# Patient Record
Sex: FEMALE | Race: WHITE | NOT HISPANIC OR LATINO | Employment: FULL TIME | URBAN - METROPOLITAN AREA
[De-identification: names, ages, dates, MRNs, and addresses within clinical notes are randomized per-mention and may not be internally consistent; named-entity substitution may affect disease eponyms.]

---

## 2023-07-13 LAB — HEPATITIS B VIRUS SURFACE AG PRESENCE IN SERUM: NONREACTIVE

## 2023-07-14 LAB
HEPATITIS C VIRUS AB PRESENCE IN SERUM: NONREACTIVE
HIV 1/ 2 AG/AB SCREEN: NONREACTIVE
HSV-1 WHOLE BLOOD: NORMAL
HSV-2 WHOLE BLOOD: NORMAL
SYPHILIS TOTAL AB: NONREACTIVE

## 2023-07-15 LAB
CHLAMYDIA TRACH., AMPLIFIED: NEGATIVE
N. GONORRHEA, AMPLIFIED: NEGATIVE

## 2023-09-27 LAB
ALANINE AMINOTRANSFERASE (SGPT) (U/L) IN SER/PLAS: 25 U/L (ref 7–45)
ALBUMIN (G/DL) IN SER/PLAS: 4.4 G/DL (ref 3.4–5)
ALKALINE PHOSPHATASE (U/L) IN SER/PLAS: 35 U/L (ref 33–110)
ANION GAP IN SER/PLAS: 10 MMOL/L (ref 10–20)
ASPARTATE AMINOTRANSFERASE (SGOT) (U/L) IN SER/PLAS: 36 U/L (ref 9–39)
BILIRUBIN TOTAL (MG/DL) IN SER/PLAS: 0.5 MG/DL (ref 0–1.2)
CALCIUM (MG/DL) IN SER/PLAS: 9.1 MG/DL (ref 8.6–10.3)
CARBON DIOXIDE, TOTAL (MMOL/L) IN SER/PLAS: 25 MMOL/L (ref 21–32)
CHLORIDE (MMOL/L) IN SER/PLAS: 105 MMOL/L (ref 98–107)
CREATININE (MG/DL) IN SER/PLAS: 0.78 MG/DL (ref 0.5–1.05)
ERYTHROCYTE DISTRIBUTION WIDTH (RATIO) BY AUTOMATED COUNT: 12.1 % (ref 11.5–14.5)
ERYTHROCYTE MEAN CORPUSCULAR HEMOGLOBIN CONCENTRATION (G/DL) BY AUTOMATED: 32.5 G/DL (ref 32–36)
ERYTHROCYTE MEAN CORPUSCULAR VOLUME (FL) BY AUTOMATED COUNT: 94 FL (ref 80–100)
ERYTHROCYTES (10*6/UL) IN BLOOD BY AUTOMATED COUNT: 4.16 X10E12/L (ref 4–5.2)
GFR FEMALE: >90 ML/MIN/1.73M2
GLUCOSE (MG/DL) IN SER/PLAS: 84 MG/DL (ref 74–99)
HEMATOCRIT (%) IN BLOOD BY AUTOMATED COUNT: 39.1 % (ref 36–46)
HEMOGLOBIN (G/DL) IN BLOOD: 12.7 G/DL (ref 12–16)
IGA (MG/DL) IN SER/PLAS: 153 MG/DL (ref 70–400)
LEUKOCYTES (10*3/UL) IN BLOOD BY AUTOMATED COUNT: 4.1 X10E9/L (ref 4.4–11.3)
NRBC (PER 100 WBCS) BY AUTOMATED COUNT: 0 /100 WBC (ref 0–0)
PLATELETS (10*3/UL) IN BLOOD AUTOMATED COUNT: 294 X10E9/L (ref 150–450)
POTASSIUM (MMOL/L) IN SER/PLAS: 4 MMOL/L (ref 3.5–5.3)
PROTEIN TOTAL: 6.9 G/DL (ref 6.4–8.2)
SODIUM (MMOL/L) IN SER/PLAS: 136 MMOL/L (ref 136–145)
THYROTROPIN (MIU/L) IN SER/PLAS BY DETECTION LIMIT <= 0.05 MIU/L: 0.57 MIU/L (ref 0.44–3.98)
TISSUE TRANSGLUTAMINASE, IGA: <1 U/ML (ref 0–14)
UREA NITROGEN (MG/DL) IN SER/PLAS: 11 MG/DL (ref 6–23)

## 2024-02-15 ENCOUNTER — OFFICE VISIT (OUTPATIENT)
Dept: OBSTETRICS AND GYNECOLOGY | Facility: CLINIC | Age: 29
End: 2024-02-15
Payer: COMMERCIAL

## 2024-02-15 VITALS — WEIGHT: 120 LBS | HEIGHT: 61 IN | BODY MASS INDEX: 22.66 KG/M2

## 2024-02-15 DIAGNOSIS — Z11.3 SCREEN FOR STD (SEXUALLY TRANSMITTED DISEASE): Primary | ICD-10-CM

## 2024-02-15 DIAGNOSIS — N89.8 VAGINAL IRRITATION: ICD-10-CM

## 2024-02-15 PROCEDURE — 87205 SMEAR GRAM STAIN: CPT

## 2024-02-15 PROCEDURE — 1036F TOBACCO NON-USER: CPT | Performed by: OBSTETRICS & GYNECOLOGY

## 2024-02-15 PROCEDURE — 87661 TRICHOMONAS VAGINALIS AMPLIF: CPT

## 2024-02-15 PROCEDURE — 99214 OFFICE O/P EST MOD 30 MIN: CPT | Performed by: OBSTETRICS & GYNECOLOGY

## 2024-02-15 PROCEDURE — 87800 DETECT AGNT MULT DNA DIREC: CPT

## 2024-02-15 RX ORDER — NORGESTREL AND ETHINYL ESTRADIOL 0.3-0.03MG
KIT ORAL
COMMUNITY
Start: 2019-11-23

## 2024-02-15 RX ORDER — FLUCONAZOLE 150 MG/1
150 TABLET ORAL ONCE
Qty: 1 TABLET | Refills: 0 | Status: SHIPPED | OUTPATIENT
Start: 2024-02-15 | End: 2024-02-15

## 2024-02-15 NOTE — PROGRESS NOTES
Patient complaining of vaginal irritation and itch with a small amount of discharge since last week.  She has recently been in [place].  No other new changes.  Using oral contraceptive pill.  Same partner.  Tried MetroGel but thinks her symptoms got a little worse.    PE  EGBUS erythema of bilateral labia minora and introitus.  Vagina patient declined speculum exam due to discomfort    A/P: Acute vaginitis appears to be consistent with yeast.  Patient would like STD screen  - gram stain  - gc/chlamydia  - trich  - diflucan x 1  Notify of results and follow up

## 2024-02-16 LAB
C TRACH RRNA SPEC QL NAA+PROBE: NEGATIVE
CLUE CELLS VAG LPF-#/AREA: ABNORMAL /[LPF]
N GONORRHOEA DNA SPEC QL PROBE+SIG AMP: NEGATIVE
NUGENT SCORE: 2
T VAGINALIS RRNA SPEC QL NAA+PROBE: NEGATIVE
YEAST VAG WET PREP-#/AREA: PRESENT

## 2024-04-23 DIAGNOSIS — N89.8 VAGINAL IRRITATION: Primary | ICD-10-CM

## 2024-04-23 RX ORDER — FLUCONAZOLE 150 MG/1
150 TABLET ORAL ONCE
COMMUNITY
End: 2024-04-23 | Stop reason: SDUPTHER

## 2024-04-23 RX ORDER — FLUCONAZOLE 150 MG/1
150 TABLET ORAL ONCE
Qty: 1 TABLET | Refills: 0 | Status: SHIPPED | OUTPATIENT
Start: 2024-04-23 | End: 2024-04-23

## 2024-04-23 NOTE — TELEPHONE ENCOUNTER
Patient states she gets reoccurring yeast infections and is requesting another refill of the diflucan. Please advise. Last seen in 02/2024.

## 2024-07-08 DIAGNOSIS — Z30.41 ENCOUNTER FOR SURVEILLANCE OF CONTRACEPTIVE PILLS: Primary | ICD-10-CM

## 2024-07-08 RX ORDER — NORGESTREL AND ETHINYL ESTRADIOL 0.3-0.03MG
KIT ORAL
Qty: 28 TABLET | Status: CANCELLED | OUTPATIENT
Start: 2024-07-08

## 2024-07-08 RX ORDER — NORGESTREL AND ETHINYL ESTRADIOL 0.3-0.03MG
1 KIT ORAL DAILY
Qty: 84 TABLET | Refills: 0 | Status: SHIPPED | OUTPATIENT
Start: 2024-07-08

## 2024-07-08 NOTE — TELEPHONE ENCOUNTER
Patient has an appointment scheduled for 7/15/24. She is asking if you could give her 1 refill on her birth control until she is seen, thank you.

## 2024-07-15 ENCOUNTER — APPOINTMENT (OUTPATIENT)
Dept: OBSTETRICS AND GYNECOLOGY | Facility: CLINIC | Age: 29
End: 2024-07-15
Payer: COMMERCIAL

## 2024-08-22 ENCOUNTER — APPOINTMENT (OUTPATIENT)
Dept: OBSTETRICS AND GYNECOLOGY | Facility: CLINIC | Age: 29
End: 2024-08-22
Payer: COMMERCIAL

## 2024-08-27 ENCOUNTER — TELEPHONE (OUTPATIENT)
Dept: OBSTETRICS AND GYNECOLOGY | Facility: CLINIC | Age: 29
End: 2024-08-27
Payer: COMMERCIAL

## 2024-08-27 DIAGNOSIS — N76.0 ACUTE VAGINITIS: Primary | ICD-10-CM

## 2024-08-27 RX ORDER — FLUCONAZOLE 150 MG/1
150 TABLET ORAL ONCE
Qty: 1 TABLET | Refills: 0 | Status: SHIPPED | OUTPATIENT
Start: 2024-08-27 | End: 2024-08-27

## 2024-08-27 NOTE — TELEPHONE ENCOUNTER
Patient states she is currently on an antibiotic that is giving her a yeast infection.  Request Diflucan to be sent to the [facility] on file.  Patient scheduled for 9/3/24 annual.    Juliane Kerns MA

## 2024-09-03 ENCOUNTER — APPOINTMENT (OUTPATIENT)
Dept: OBSTETRICS AND GYNECOLOGY | Facility: CLINIC | Age: 29
End: 2024-09-03
Payer: COMMERCIAL

## 2024-09-30 DIAGNOSIS — Z30.41 ENCOUNTER FOR SURVEILLANCE OF CONTRACEPTIVE PILLS: ICD-10-CM

## 2024-09-30 RX ORDER — NORGESTREL AND ETHINYL ESTRADIOL 0.3-0.03MG
1 KIT ORAL DAILY
Qty: 84 TABLET | Refills: 0 | Status: SHIPPED | OUTPATIENT
Start: 2024-09-30

## 2024-09-30 NOTE — TELEPHONE ENCOUNTER
Patient is requesting a refill of birth control - no showed x3 - informed patient that you may not refill due to the no shows. Annual scheduled for 10/31/24.

## 2024-10-31 ENCOUNTER — APPOINTMENT (OUTPATIENT)
Dept: OBSTETRICS AND GYNECOLOGY | Facility: CLINIC | Age: 29
End: 2024-10-31
Payer: COMMERCIAL

## 2024-11-19 ENCOUNTER — TELEPHONE (OUTPATIENT)
Dept: OBSTETRICS AND GYNECOLOGY | Facility: CLINIC | Age: 29
End: 2024-11-19
Payer: COMMERCIAL

## 2024-11-19 DIAGNOSIS — R39.9 UTI SYMPTOMS: Primary | ICD-10-CM

## 2024-11-19 NOTE — TELEPHONE ENCOUNTER
Spoke to patient making her aware Dr. Arshad placed an order for urine culture in the system and once results are received she will send in RX for treatment if needed.  If the result is negative patient should come in for appointment.  Patient told she could go to any  outpatient lab to complete urine culture.    Patient stated she will just go to urgent care.    Juliane Kerns MA

## 2024-11-19 NOTE — TELEPHONE ENCOUNTER
Patient feels she is getting a UTI and is requesting an antibiotic to be sent to pharmacy to treat.  I made patient aware you do not typically treat without seeing the patient in office and offered her a 10 am appointment for today in [place].   However, patient is not able to call off of work to make that appointment.    Juliane Kerns MA

## 2024-12-09 ENCOUNTER — TELEPHONE (OUTPATIENT)
Dept: OBSTETRICS AND GYNECOLOGY | Facility: CLINIC | Age: 29
End: 2024-12-09
Payer: COMMERCIAL

## 2024-12-09 DIAGNOSIS — N76.0 ACUTE VAGINITIS: Primary | ICD-10-CM

## 2024-12-09 RX ORDER — FLUCONAZOLE 150 MG/1
150 TABLET ORAL ONCE
Qty: 2 TABLET | Refills: 0 | Status: SHIPPED | OUTPATIENT
Start: 2024-12-09 | End: 2024-12-09

## 2024-12-09 NOTE — TELEPHONE ENCOUNTER
Patient is requesting a rx for a yeast infection. She was treated last week for UTI and now has a yeast infection. Prefers a tablet, states last time you prescribed a cream. Patient has appointment next week.

## 2024-12-16 ENCOUNTER — APPOINTMENT (OUTPATIENT)
Dept: OBSTETRICS AND GYNECOLOGY | Facility: CLINIC | Age: 29
End: 2024-12-16

## 2024-12-16 VITALS
BODY MASS INDEX: 22.09 KG/M2 | HEIGHT: 61 IN | SYSTOLIC BLOOD PRESSURE: 110 MMHG | WEIGHT: 117 LBS | DIASTOLIC BLOOD PRESSURE: 62 MMHG

## 2024-12-16 DIAGNOSIS — Z30.41 ENCOUNTER FOR SURVEILLANCE OF CONTRACEPTIVE PILLS: Primary | ICD-10-CM

## 2024-12-16 DIAGNOSIS — Z01.419 WELL WOMAN EXAM: ICD-10-CM

## 2024-12-16 DIAGNOSIS — N76.0 ACUTE VAGINITIS: ICD-10-CM

## 2024-12-16 PROBLEM — J45.991 COUGH VARIANT ASTHMA (HHS-HCC): Status: ACTIVE | Noted: 2024-12-16

## 2024-12-16 PROCEDURE — 87624 HPV HI-RISK TYP POOLED RSLT: CPT

## 2024-12-16 PROCEDURE — 99395 PREV VISIT EST AGE 18-39: CPT | Performed by: OBSTETRICS & GYNECOLOGY

## 2024-12-16 PROCEDURE — 87205 SMEAR GRAM STAIN: CPT

## 2024-12-16 PROCEDURE — 88175 CYTOPATH C/V AUTO FLUID REDO: CPT

## 2024-12-16 PROCEDURE — 3008F BODY MASS INDEX DOCD: CPT | Performed by: OBSTETRICS & GYNECOLOGY

## 2024-12-16 PROCEDURE — 88141 CYTOPATH C/V INTERPRET: CPT | Performed by: PATHOLOGY

## 2024-12-16 RX ORDER — NORGESTREL AND ETHINYL ESTRADIOL 0.3-0.03MG
1 KIT ORAL DAILY
Qty: 84 TABLET | Refills: 3 | Status: SHIPPED | OUTPATIENT
Start: 2024-12-16

## 2024-12-16 ASSESSMENT — PAIN SCALES - GENERAL: PAINLEVEL_OUTOF10: 0-NO PAIN

## 2024-12-16 NOTE — PROGRESS NOTES
"Dariela Weir is a 29 y.o. female who is here for a routine exam. PCP = No Assigned PCP Generic Provider, MD  Complains of vaginal irritation and dyspareunia due to irritation  Was treated for UTI and then a yeast infection recently  Still with same partner for almost 3 years, he is a [place]   Started looking at rings    Menses : monthly  Contraception : OCP  HPV vaccine : Yes  Last pap : 2023 normal  Last HPV : Never  History of abnormal pap : Yes, describe: ROLAND-2 status post LEEP 2018  Last mammogram : Never  History of abnormal mammogram : No  Colon cancer screen : Never    ROS  systems reviewed, anything negative noted in HPI    bladder: no dysuria, gross hematuria, urinary frequency, urgency or incontinence  breast: no lumps, nipple d/c, overlying skin changes, redness, or skin retraction    [unfilled]    Past med hx and past surg hx reviewed and notable for: ROLAND-2    Objective   /62   Ht 1.549 m (5' 1\")   Wt 53.1 kg (117 lb)   LMP 11/18/2024 (Approximate)   BMI 22.11 kg/m²      General:   Alert and oriented, in no acute distress   Neck: Supple. No visible thyromegaly.    Breast/Axilla: Normal to palpation bilaterally without masses, skin changes, lymphadenopathy, or nipple discharge.    Abdomen: Soft, non-tender, without masses or organomegaly   Vulva: Normal architecture without erythema, masses, or lesions.    Vagina: Normal mucosa without lesions, masses, or atrophy. No abnormal vaginal discharge.    Cervix: Normal without masses, lesions, or signs of cervicitis.    Uterus: Normal mobile, non-enlarged uterus    Adnexa: Normal without masses or lesions   Pelvic Floor No POP noted.    Psych Normal affect. Normal mood.      Thank you for coming to your annual exam. Your findings during the exam were normal. Specific topics addressed during this exam included: healthy lifestyle, well woman screening guidelines,     Actions performed during this visit include:  - Clinical breast exam  - " Clinical pelvic exam  - pap/hpv  - RF OCP  Orders Placed This Encounter   Procedures    Vaginitis Gram Stain For Bacterial Vaginosis + Yeast           Please return for your next visit in 1 year.

## 2024-12-17 LAB
CLUE CELLS VAG LPF-#/AREA: NORMAL /[LPF]
NUGENT SCORE: 0
YEAST VAG WET PREP-#/AREA: NORMAL

## 2024-12-18 ENCOUNTER — TELEPHONE (OUTPATIENT)
Dept: OBSTETRICS AND GYNECOLOGY | Facility: CLINIC | Age: 29
End: 2024-12-18
Payer: COMMERCIAL

## 2024-12-18 NOTE — TELEPHONE ENCOUNTER
Notified patient that results were negative she would like to know why she is still having the itching and what is causing it.

## 2024-12-19 ENCOUNTER — DOCUMENTATION (OUTPATIENT)
Dept: OBSTETRICS AND GYNECOLOGY | Facility: CLINIC | Age: 29
End: 2024-12-19
Payer: COMMERCIAL

## 2024-12-19 NOTE — PROGRESS NOTES
Patient informed her vaginal culture was negative for infection  She did take 1 Diflucan pill prior to the appointment  She states that her symptoms are better now  She has the second Diflucan pill to use as needed

## 2025-02-10 ENCOUNTER — TELEPHONE (OUTPATIENT)
Dept: OBSTETRICS AND GYNECOLOGY | Facility: CLINIC | Age: 30
End: 2025-02-10
Payer: COMMERCIAL

## 2025-02-10 DIAGNOSIS — R39.9 UTI SYMPTOMS: Primary | ICD-10-CM

## 2025-02-10 RX ORDER — CEPHALEXIN 500 MG/1
500 CAPSULE ORAL 2 TIMES DAILY
Qty: 14 CAPSULE | Refills: 0 | Status: SHIPPED | OUTPATIENT
Start: 2025-02-10 | End: 2025-02-17

## 2025-02-10 NOTE — PROGRESS NOTES
Patient complains of UTI symptoms  Concerned about history of frequent UTIs  Discussed with patient that there are no documented positive urine cultures in the computer  Patient has been treated by her primary care doctor based on symptoms but no urine was tested  Urine culture ordered, [facility]

## 2025-02-10 NOTE — TELEPHONE ENCOUNTER
Patient was wondering if you felt as if there is something to be concerned about with the reoccurring UTIs? Or if she should get her urine checked or what could be causing this. Please call - patient wanted to speak with you specifically when you had a moment.

## 2025-02-10 NOTE — TELEPHONE ENCOUNTER
Patient has another UTI - burning, frequency and pressure. Patient is requesting a rx to be sent in and will make an appointment if symptoms don''t clear. She was just in recently for an appointment.

## 2025-02-13 ENCOUNTER — TELEPHONE (OUTPATIENT)
Dept: OBSTETRICS AND GYNECOLOGY | Facility: CLINIC | Age: 30
End: 2025-02-13
Payer: COMMERCIAL

## 2025-02-13 ENCOUNTER — DOCUMENTATION (OUTPATIENT)
Dept: OBSTETRICS AND GYNECOLOGY | Facility: CLINIC | Age: 30
End: 2025-02-13
Payer: COMMERCIAL

## 2025-02-13 LAB — BACTERIA UR CULT: ABNORMAL

## 2025-02-13 NOTE — PROGRESS NOTES
Discussed with patient that urine culture showed a low level of bacteria but not the level that would be typically called a urinary tract infection.  Patient concerned with frequent UTIs however there are no documented positive urine cultures in the system.  Patient opts to try antibiotics for the low level of bacteria.  If she continues to be symptomatic would recommend urology consult

## 2025-02-13 NOTE — TELEPHONE ENCOUNTER
I spoke with patient regarding starting the antibiotic and she would like to speak with you regarding the culture results.

## 2025-03-09 PROBLEM — R87.612 LOW GRADE SQUAMOUS INTRAEPITHELIAL LESION (LGSIL) ON PAPANICOLAOU SMEAR OF CERVIX: Status: ACTIVE | Noted: 2025-03-09

## 2025-03-09 PROBLEM — J45.991 COUGH VARIANT ASTHMA (HHS-HCC): Status: ACTIVE | Noted: 2020-09-11

## 2025-03-09 PROBLEM — S73.191D ACETABULAR LABRUM TEAR, RIGHT, SUBSEQUENT ENCOUNTER: Status: ACTIVE | Noted: 2025-03-09

## 2025-03-09 PROBLEM — N87.1 CIN II (CERVICAL INTRAEPITHELIAL NEOPLASIA II): Status: ACTIVE | Noted: 2025-03-09

## 2025-03-09 PROBLEM — B96.89 BACTERIAL VAGINOSIS: Status: ACTIVE | Noted: 2025-03-09

## 2025-03-09 PROBLEM — M76.01 GLUTEAL TENDINITIS OF RIGHT BUTTOCK: Status: ACTIVE | Noted: 2025-03-09

## 2025-03-09 PROBLEM — J45.20 MILD INTERMITTENT ASTHMA WITHOUT COMPLICATION (HHS-HCC): Status: ACTIVE | Noted: 2024-07-11

## 2025-03-09 PROBLEM — M25.851 FEMOROACETABULAR IMPINGEMENT OF RIGHT HIP: Status: ACTIVE | Noted: 2022-04-08

## 2025-03-09 PROBLEM — N63.0 LUMP OR MASS IN BREAST: Status: ACTIVE | Noted: 2025-03-09

## 2025-03-09 PROBLEM — L01.00 IMPETIGO: Status: ACTIVE | Noted: 2023-09-13

## 2025-03-09 PROBLEM — F43.10 PTSD (POST-TRAUMATIC STRESS DISORDER): Status: ACTIVE | Noted: 2019-11-23
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
## 2025-03-09 PROBLEM — T19.2XXA RETAINED TAMPON: Status: ACTIVE | Noted: 2025-03-09

## 2025-03-09 PROBLEM — N87.9 CERVICAL DYSPLASIA: Status: ACTIVE | Noted: 2020-09-11

## 2025-03-09 PROBLEM — N76.0 BACTERIAL VAGINOSIS: Status: ACTIVE | Noted: 2025-03-09

## 2025-03-09 PROBLEM — F33.9 MDD (MAJOR DEPRESSIVE DISORDER), RECURRENT EPISODE (CMS-HCC): Chronic | Status: ACTIVE | Noted: 2022-12-07

## 2025-03-10 ENCOUNTER — APPOINTMENT (OUTPATIENT)
Dept: OBSTETRICS AND GYNECOLOGY | Facility: CLINIC | Age: 30
End: 2025-03-10
Payer: COMMERCIAL

## 2025-03-10 VITALS — BODY MASS INDEX: 21.9 KG/M2 | TEMPERATURE: 98.1 F | WEIGHT: 116 LBS | HEIGHT: 61 IN

## 2025-03-10 DIAGNOSIS — T19.2XXA RETAINED TAMPON, INITIAL ENCOUNTER: ICD-10-CM
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
## 2025-03-10 PROCEDURE — 99213 OFFICE O/P EST LOW 20 MIN: CPT | Performed by: OBSTETRICS & GYNECOLOGY

## 2025-03-10 PROCEDURE — 3008F BODY MASS INDEX DOCD: CPT | Performed by: OBSTETRICS & GYNECOLOGY

## 2025-03-10 RX ORDER — DILTIAZEM HYDROCHLORIDE 60 MG/1
2 TABLET, FILM COATED ORAL 2 TIMES DAILY
COMMUNITY
Start: 2025-02-21

## 2025-03-10 RX ORDER — FLUTICASONE PROPIONATE 50 MCG
1 SPRAY, SUSPENSION (ML) NASAL DAILY
COMMUNITY
Start: 2025-02-21

## 2025-03-10 NOTE — PROGRESS NOTES
Patient here concerned about retained tampon since last week  She had her period last week it did not remember removing the tampon  She then had intercourse and felt that there was something in her vagina  No vaginal discharge or odor    EGBUS normal  Vagina no lesions, no tampon noted  Cervix no lesions  Bimanual no mass nontender    A/P: No evidence of retained tampon.  Pelvic exam normal.  Patient reassured

## 2025-06-02 ENCOUNTER — TELEPHONE (OUTPATIENT)
Dept: OBSTETRICS AND GYNECOLOGY | Facility: CLINIC | Age: 30
End: 2025-06-02
Payer: COMMERCIAL

## 2025-06-02 DIAGNOSIS — R39.9 UTI SYMPTOMS: Primary | ICD-10-CM

## 2025-06-02 NOTE — TELEPHONE ENCOUNTER
Dariela thinks she has another UTI.  She wants to know if she should come in and leave a urine or can you call in a script?

## 2025-06-03 NOTE — TELEPHONE ENCOUNTER
Patient called stating she left a urine sample for urine culture yesterday and would like to know when you will be calling in an antibiotic for treatment.     I explained to patient that it typically takes 3 days for the culture to result due to the lab needing to see if bacteria grows and what antibiotic is sensitive to the bacteria or ir there is no growth, she would not need an antibiotic or possibly need to see you to determine what is causing her discomfort.    Patient stated after that explanation that you have sent her in something sooner than 3 days in the past and that the culture usually does not take that long.     Please call patient to discuss her treatment plan.    Juliane Kerns MA

## 2025-06-05 LAB — BACTERIA UR CULT: NORMAL

## 2025-06-05 NOTE — TELEPHONE ENCOUNTER
Please call patient she would like to speak to you regarding the results she is still having symptoms of a UTI

## 2025-06-06 ENCOUNTER — TELEPHONE (OUTPATIENT)
Dept: OBSTETRICS AND GYNECOLOGY | Facility: CLINIC | Age: 30
End: 2025-06-06
Payer: COMMERCIAL

## 2025-06-06 DIAGNOSIS — R39.9 UTI SYMPTOMS: Primary | ICD-10-CM

## 2025-06-06 NOTE — TELEPHONE ENCOUNTER
Patient has an appointment with the urologist for 7/2/25 because of insurance and wants to know if she is not still having symptoms if she she keep the appointment.

## 2025-06-06 NOTE — PROGRESS NOTES
Discussed with patient UTI symptoms with negative urine culture  Patient states she still has dysuria  Discussed possibility of interstitial cystitis  Would recommend urology consult  Referral placed  Discussed fragrance free hypoallergenic products

## 2025-07-02 ENCOUNTER — HOSPITAL ENCOUNTER (OUTPATIENT)
Dept: RADIOLOGY | Facility: HOSPITAL | Age: 30
Discharge: HOME | End: 2025-07-02
Payer: COMMERCIAL

## 2025-07-02 ENCOUNTER — APPOINTMENT (OUTPATIENT)
Age: 30
End: 2025-07-02
Payer: COMMERCIAL

## 2025-07-02 VITALS — DIASTOLIC BLOOD PRESSURE: 84 MMHG | SYSTOLIC BLOOD PRESSURE: 126 MMHG | HEART RATE: 80 BPM

## 2025-07-02 DIAGNOSIS — R10.2 PELVIC PAIN: Primary | ICD-10-CM

## 2025-07-02 DIAGNOSIS — R39.9 UTI SYMPTOMS: ICD-10-CM

## 2025-07-02 DIAGNOSIS — R10.2 PELVIC PAIN: ICD-10-CM

## 2025-07-02 LAB
POC APPEARANCE, URINE: ABNORMAL
POC BILIRUBIN, URINE: NEGATIVE
POC BLOOD, URINE: NEGATIVE
POC COLOR, URINE: YELLOW
POC GLUCOSE, URINE: NEGATIVE MG/DL
POC KETONES, URINE: NEGATIVE MG/DL
POC LEUKOCYTES, URINE: NEGATIVE
POC NITRITE,URINE: NEGATIVE
POC PH, URINE: 6 PH
POC PROTEIN, URINE: NEGATIVE MG/DL
POC SPECIFIC GRAVITY, URINE: 1.02
POC UROBILINOGEN, URINE: 0.2 EU/DL

## 2025-07-02 PROCEDURE — 81003 URINALYSIS AUTO W/O SCOPE: CPT | Performed by: NURSE PRACTITIONER

## 2025-07-02 PROCEDURE — 76856 US EXAM PELVIC COMPLETE: CPT | Performed by: STUDENT IN AN ORGANIZED HEALTH CARE EDUCATION/TRAINING PROGRAM

## 2025-07-02 PROCEDURE — 76856 US EXAM PELVIC COMPLETE: CPT

## 2025-07-02 PROCEDURE — 99204 OFFICE O/P NEW MOD 45 MIN: CPT | Performed by: NURSE PRACTITIONER

## 2025-07-02 PROCEDURE — 76770 US EXAM ABDO BACK WALL COMP: CPT | Performed by: STUDENT IN AN ORGANIZED HEALTH CARE EDUCATION/TRAINING PROGRAM

## 2025-07-02 PROCEDURE — 76770 US EXAM ABDO BACK WALL COMP: CPT

## 2025-07-02 PROCEDURE — 76830 TRANSVAGINAL US NON-OB: CPT | Performed by: STUDENT IN AN ORGANIZED HEALTH CARE EDUCATION/TRAINING PROGRAM

## 2025-07-02 NOTE — PROGRESS NOTES
***UROLOGIC INITIAL EVALUATION  ***UROLOGIC FOLLOW-UP VISIT     PROBLEM LIST:  1. UTI symptoms  Referral to Urology    POCT UA Automated manually resulted           HISTORY OF PRESENT ILLNESS:   Dariela Weir is a 29 y.o. with ***   Kindly referred [facility] today *** for evaluation and management of ***  Seen unaccompanied  Reports frequent UTI  Foul odour, dysuria, darker yellow  Estimates 4-5 UTIs in the past year  Culture negative, no treatment  Using Dial soap, being careful with boyfriend  Sensation of something sharp inside x past month  Nothing on exam with Gyn  Radiates down into pelvis  Sharp RLQ x 2 days  No nausea    Mom has a history of ovarian cyst    PAST MEDICAL HISTORY:  Medical History[1]    PAST SURGICAL HISTORY:  Surgical History[2]     ALLERGIES:   Allergies[3]     MEDICATIONS:   Medications Ordered Prior to Encounter[4]     SOCIAL HISTORY:  Patient  reports that she has never smoked. She has never used smokeless tobacco. She reports that she does not use drugs.   Social History     Socioeconomic History    Marital status: Single     Spouse name: Not on file    Number of children: Not on file    Years of education: Not on file    Highest education level: Not on file   Occupational History    Not on file   Tobacco Use    Smoking status: Never    Smokeless tobacco: Never   Substance and Sexual Activity    Alcohol use: Not on file    Drug use: Never    Sexual activity: Not on file   Other Topics Concern    Not on file   Social History Narrative    Not on file     Social Drivers of Health     Financial Resource Strain: Low Risk  (3/10/2025)    Received from [facility]    Overall Financial Resource Strain (CARDIA)     Difficulty of Paying Living Expenses: Not hard at all   Food Insecurity: No Food Insecurity (3/10/2025)    Received from [facility]    Hunger Vital Sign     Worried About Running Out of Food in the Last Year: Never true     Ran Out of Food in the Last Year: Never true    Transportation Needs: No Transportation Needs (3/10/2025)    Received from [facility]    PRAPARE - Transportation     Lack of Transportation (Medical): No     Lack of Transportation (Non-Medical): No   Physical Activity: Sufficiently Active (3/10/2025)    Received from [facility]    Exercise Vital Sign     Days of Exercise per Week: 4 days     Minutes of Exercise per Session: 40 min   Stress: Stress Concern Present (3/10/2025)    Received from [facility]    [place] [employer] - Occupational Stress Questionnaire     Feeling of Stress : Rather much   Social Connections: Moderately Integrated (3/10/2025)    Received from [facility]    Social Connection and Isolation Panel [NHANES]     Frequency of Communication with Friends and Family: Once a week     Frequency of Social Gatherings with Friends and Family: Twice a week     Attends Christianity Services: More than 4 times per year     Active Member of Clubs or Organizations: No     Attends Club or Organization Meetings: Never     Marital Status: Living with partner   Intimate Partner Violence: Not on file   Housing Stability: Unknown (3/10/2025)    Received from [facility]    Housing Stability Vital Sign     Unable to Pay for Housing in the Last Year: No     Number of Times Moved in the Last Year: Not on file     Homeless in the Last Year: Not on file       FAMILY HISTORY:  Family History[5]    REVIEW OF SYSTEMS:  All systems reviewed, pertinent negatives as noted in HPI.     PHYSICAL EXAM:  Visit Vitals  /84   Pulse 80     Constitutional: Well-developed and well-nourished. No distress.    Head: Normocephalic and atraumatic.    Neck: Normal range of motion.     Pulmonary/Chest: Effort normal. No respiratory distress.   Abdominal: Non-distended.  : See below. ***  Integumentary: No rash or lesions visualized.  Musculoskeletal: Normal range of motion.    Neurological: Alert, grossly intact.  Psychiatric: Normal  "mood and affect. Thought content normal.      LABORATORY REVIEW:   Lab Results   Component Value Date    BUN 11 09/27/2023    CREATININE 0.78 09/27/2023     09/27/2023    K 4.0 09/27/2023     09/27/2023    CO2 25 09/27/2023    CALCIUM 9.1 09/27/2023      Lab Results   Component Value Date    WBC 4.1 (L) 09/27/2023    RBC 4.16 09/27/2023    HGB 12.7 09/27/2023    HCT 39.1 09/27/2023    MCV 94 09/27/2023    MCHC 32.5 09/27/2023    RDW 12.1 09/27/2023     09/27/2023        No results found for: \"PSA\"          Assessment:      1. UTI symptoms  Referral to Urology    POCT UA Automated manually resulted          Dariela Weir is a 29 y.o. with ***    I personally reviewed & interpreted available labs and imaging     Plan:   ***           [1]   Past Medical History:  Diagnosis Date    Low grade squamous intraepithelial lesion on cytologic smear of cervix (LGSIL)     Low grade squamous intraepithelial lesion (LGSIL) on cervical Pap smear    Other sprain of right hip, initial encounter 04/25/2022    Acetabular labrum tear, right, initial encounter    Personal history of other infectious and parasitic diseases     History of herpes simplex infection   [2]   Past Surgical History:  Procedure Laterality Date    OTHER SURGICAL HISTORY  01/14/2021    Colposcopy    OTHER SURGICAL HISTORY  01/14/2021    Loop electrosurgical excision procedure    OTHER SURGICAL HISTORY  01/14/2021    Breast augmentation   [3] No Known Allergies  [4]   Current Outpatient Medications on File Prior to Visit   Medication Sig Dispense Refill    fluticasone (Flonase) 50 mcg/actuation nasal spray Administer 1 spray into each nostril once daily.      norgestrel-ethinyl estradioL (Low-Ogestrel, 28,) 0.3-30 mg-mcg tablet Take 1 tablet by mouth once daily. 84 tablet 3    Symbicort 80-4.5 mcg/actuation inhaler Inhale 2 puffs 2 times a day. as instructed       No current facility-administered medications on file prior to visit.   [5] No " family history on file.

## 2025-07-02 NOTE — LETTER
July 17, 2025     Nancy Arshad MD  [address] [address]  [place] [address]    Patient: Dariela Weir   YOB: 1995   Date of Visit: 7/2/2025       Dear Dr. Nancy Arshad MD:    Thank you for referring Dariela Weir to me for evaluation. Below are my notes for this consultation.  If you have questions, please do not hesitate to call me. I look forward to following your patient along with you.       Sincerely,     Geo Lucas, APRN-CNP      CC: No Recipients  ______________________________________________________________________________________    UROLOGIC INITIAL EVALUATION     PROBLEM LIST:  1. Pelvic pain  US PELVIS TRANSABDOMINAL WITH TRANSVAGINAL      2. UTI symptoms  Referral to Urology    POCT UA Automated manually resulted    US renal complete    US PELVIS TRANSABDOMINAL WITH TRANSVAGINAL    Urine Culture    Urine Culture           HISTORY OF PRESENT ILLNESS:   Dariela Weir is a 29 y.o. with PTSD, MDD  Kindly referred by OB-GYN for evaluation and management of UTI symptoms  Seen unaccompanied  Reports frequent UTIs  Symptoms include foul odour, dysuria, darker yellow colour  Estimates 4-5 UTIs in the past year  Cultures negative, did not receive antibiotics  Using Dial soap, being careful with boyfriend  Sensation of something sharp inside x past month  Nothing on exam with Gyn  Sharp RLQ x 2 days  Radiates down into pelvis  No nausea or constipation    Mom has a history of ovarian cyst    PAST MEDICAL HISTORY:  Medical History[1]    PAST SURGICAL HISTORY:  Surgical History[2]     ALLERGIES:   Allergies[3]     MEDICATIONS:   Medications Ordered Prior to Encounter[4]     SOCIAL HISTORY:  Patient  reports that she has never smoked. She has never used smokeless tobacco. She reports that she does not use drugs.   Social History     Socioeconomic History   • Marital status: Single     Spouse name: Not on file   • Number of children: Not on file   • Years of education:  Not on file   • Highest education level: Not on file   Occupational History   • Not on file   Tobacco Use   • Smoking status: Never   • Smokeless tobacco: Never   Substance and Sexual Activity   • Alcohol use: Not on file   • Drug use: Never   • Sexual activity: Not on file   Other Topics Concern   • Not on file   Social History Narrative   • Not on file     Social Drivers of Health     Financial Resource Strain: Low Risk  (3/10/2025)    Received from [facility]    Overall Financial Resource Strain (CARDIA)    • Difficulty of Paying Living Expenses: Not hard at all   Food Insecurity: No Food Insecurity (3/10/2025)    Received from [facility]    Hunger Vital Sign    • Worried About Running Out of Food in the Last Year: Never true    • Ran Out of Food in the Last Year: Never true   Transportation Needs: No Transportation Needs (3/10/2025)    Received from [facility]    PRAPARE - Transportation    • Lack of Transportation (Medical): No    • Lack of Transportation (Non-Medical): No   Physical Activity: Sufficiently Active (3/10/2025)    Received from [facility]    Exercise Vital Sign    • Days of Exercise per Week: 4 days    • Minutes of Exercise per Session: 40 min   Stress: Stress Concern Present (3/10/2025)    Received from [facility]    [place] [employer]ational Health - Occupational Stress Questionnaire    • Feeling of Stress : Rather much   Social Connections: Moderately Integrated (3/10/2025)    Received from [facility]    Social Connection and Isolation Panel [NHANES]    • Frequency of Communication with Friends and Family: Once a week    • Frequency of Social Gatherings with Friends and Family: Twice a week    • Attends Christianity Services: More than 4 times per year    • Active Member of Clubs or Organizations: No    • Attends Club or Organization Meetings: Never    • Marital Status: Living with partner   Intimate Partner Violence: Not on file   Housing Stability:  Unknown (3/10/2025)    Received from [facility]    Housing Stability Vital Sign    • Unable to Pay for Housing in the Last Year: No    • Number of Times Moved in the Last Year: Not on file    • Homeless in the Last Year: Not on file       FAMILY HISTORY:  Family History[5]    REVIEW OF SYSTEMS:  All systems reviewed, pertinent negatives as noted in HPI.     PHYSICAL EXAM:  Visit Vitals  /84   Pulse 80     Constitutional: Well-developed and well-nourished. No distress.    Head: Normocephalic and atraumatic.    Neck: Normal range of motion.     Pulmonary/Chest: Effort normal. No respiratory distress.   Abdominal: Non-distended.  : See below.   Integumentary: No rash or lesions visualized.  Musculoskeletal: Normal range of motion.    Neurological: Alert, grossly intact.  Psychiatric: Normal mood and affect. Thought content normal.      LABORATORY REVIEW:   Lab Results   Component Value Date    BUN 11 09/27/2023    CREATININE 0.78 09/27/2023     09/27/2023    K 4.0 09/27/2023     09/27/2023    CO2 25 09/27/2023    CALCIUM 9.1 09/27/2023      Lab Results   Component Value Date    WBC 4.1 (L) 09/27/2023    RBC 4.16 09/27/2023    HGB 12.7 09/27/2023    HCT 39.1 09/27/2023    MCV 94 09/27/2023    MCHC 32.5 09/27/2023    RDW 12.1 09/27/2023     09/27/2023           Assessment:      1. Pelvic pain  US PELVIS TRANSABDOMINAL WITH TRANSVAGINAL      2. UTI symptoms  Referral to Urology    POCT UA Automated manually resulted    US renal complete    US PELVIS TRANSABDOMINAL WITH TRANSVAGINAL    Urine Culture    Urine Culture          Dariela Weir is a 29 y.o. with recurrent UTI symptoms and RLQ/pelvic pain  One positive urine culture, low colony count Group B Strep 2/11/25  BV & yeast negative 12/16/24  UA today cloudy, otherwise negative  I personally reviewed & interpreted available labs and imaging    Discussed importance of microbiome to genitourinary health, strongly recommend avoiding soap,  especially antibacterial soap, in the genital area  Current symptoms and UA not indicative of active UTI  Agreeable to plan as below     Plan:   Standing order for urine culture  Discussed D-mannose for prevention of UTI  Renal & pelvic US  RTC pending above results, consider pelvic floor PT if no other clear etiology   Encouraged to contact us in the interim with any questions, concerns            [1]  Past Medical History:  Diagnosis Date   • Low grade squamous intraepithelial lesion on cytologic smear of cervix (LGSIL)     Low grade squamous intraepithelial lesion (LGSIL) on cervical Pap smear   • Other sprain of right hip, initial encounter 04/25/2022    Acetabular labrum tear, right, initial encounter   • Personal history of other infectious and parasitic diseases     History of herpes simplex infection   [2]  Past Surgical History:  Procedure Laterality Date   • OTHER SURGICAL HISTORY  01/14/2021    Colposcopy   • OTHER SURGICAL HISTORY  01/14/2021    Loop electrosurgical excision procedure   • OTHER SURGICAL HISTORY  01/14/2021    Breast augmentation   [3]  No Known Allergies  [4]  Current Outpatient Medications on File Prior to Visit   Medication Sig Dispense Refill   • fluticasone (Flonase) 50 mcg/actuation nasal spray Administer 1 spray into each nostril once daily.     • norgestrel-ethinyl estradioL (Low-Ogestrel, 28,) 0.3-30 mg-mcg tablet Take 1 tablet by mouth once daily. 84 tablet 3   • Symbicort 80-4.5 mcg/actuation inhaler Inhale 2 puffs 2 times a day. as instructed       No current facility-administered medications on file prior to visit.   [5]  No family history on file.

## 2025-07-02 NOTE — RESULT ENCOUNTER NOTE
Thanks for getting your reports in so quickly. I was expecting to get a complete renal US which usually includes the ureters & bladder. I'm actually most concerned about the possibility of a R ureteral stone. Were the ureters imaged & if so would you be able to addend your report?

## 2025-07-02 NOTE — RESULT ENCOUNTER NOTE
Hi Dr. Arshad - I saw Ali today for recurrent UTI. She's also still concerned about her LLQ pain. Her renal US was negative for stones and the read on the TVUS seems unremarkable but wanted to make sure you had a chance to review.

## 2025-07-02 NOTE — PATIENT INSTRUCTIONS
D-Mannose for the Prevention of Urinary Infection    For preventing frequent UTIs: 2 grams once daily, or 1 gram twice daily.  For treating an active UTI: 1.5 grams twice daily for 3 days, and then once daily for 10 days; or 1 gram three times daily for 14 days.    Many products come in 500-milligram capsules. This means that you may need to take two to four capsules to get the desired dose.  To use D-mannose powder, dissolve it in a glass of water, and then drink the mixture. The powder dissolves easily, and the water will have a sweet taste.    What is D-mannose?  D-mannose is a type of sugar that's related to the better-known glucose. These sugars are both simple sugars --that is, they consist of just one molecule of sugar. As well, both occur naturally in your body and are also found in some plants in the form of starch.    Several fruits and vegetables contain D-mannose, including:  cranberries (and cranberry juice)  apples  oranges  peaches  broccoli  green beans    This sugar is also found in certain nutritional supplements, available as capsules or powders. Some contain D-mannose by itself, while others include additional ingredients, such as:  cranberry  dandelion extract  hibiscus  kenton hips  probiotics    Many people take D-mannose for treating and preventing urinary tract infections (UTIs). D-mannose is thought to block certain bacteria from growing in the urinary tract.     What the science says  E. coli bacteria cause 90 percent of UTIs. Once these bacteria enter the urinary tract, they latch on to cells, grow, and cause infection. Researchers think that D-mannose works for a UTI by preventing these bacteria from latching on.    After you consume foods or supplements containing D-mannose, your body eventually eliminates it through the kidneys and into the urinary tract. While in the urinary tract, it can attach to the E. coli bacteria that may be there. As a result, the bacteria can no longer attach to  cells and cause infection.    How to use D-mannose  A lot of different D-mannose products are available. When deciding on which to use, you should consider three things:  whether you're trying to prevent an infection or treat an active infection  the dose you'll need to take  the type of product you want to take    D-mannose is typically used for preventing a UTI in people who have frequent UTIs or for treating an active UTI. It's important to know which of these you are using it for because the dosage will differ.    D-mannose comes in capsules and powders. The form you choose mainly depends on your preference. You might prefer a powder if you don't like to take bulky capsules or want to avoid the fillers included in some manufacturers' capsules.    Keep in mind that many products come in 500-milligram capsules. This means that you may need to take two to four capsules to get the desired dose.  To use D-mannose powder, dissolve it in a glass of water, and then drink the mixture. The powder dissolves easily, and the water will have a sweet taste.    Side effects of taking D-mannose  Most people who take D-mannose don't experience side effects, but some might have loose stools or diarrhea.    If you have diabetes, talk with your doctor before taking D-mannose. It makes sense to be cautious since D-mannose is a form of sugar. Your doctor might want to monitor your blood sugar levels more closely if you take D-mannose.    If you have an active UTI, don't delay in talking with a healthcare provider. Although D-mannose might help treat infections for some people, the evidence isn't very strong at this point. Delaying treatment with an antibiotic that has been proven to be effective can result in a more serious infection of the kidneys and blood.    Stick with the proven methods  D-mannose appears to be a promising nutritional supplement that may be an option for treating and preventing UTIs, especially in people who have  frequent UTIs. Most people who take it don't experience any side effects.    Talk with your doctor if you have an active UTI. Although D-mannose might help treat a UTI for some people, it's important to follow proven methods of treatment to prevent the development of a more serious infection.

## 2025-07-07 ENCOUNTER — TELEPHONE (OUTPATIENT)
Dept: OBSTETRICS AND GYNECOLOGY | Facility: CLINIC | Age: 30
End: 2025-07-07
Payer: COMMERCIAL

## 2025-07-07 ENCOUNTER — TELEPHONE (OUTPATIENT)
Age: 30
End: 2025-07-07
Payer: COMMERCIAL

## 2025-07-07 ENCOUNTER — DOCUMENTATION (OUTPATIENT)
Dept: OBSTETRICS AND GYNECOLOGY | Facility: CLINIC | Age: 30
End: 2025-07-07
Payer: COMMERCIAL

## 2025-07-07 NOTE — PROGRESS NOTES
Discussed with patient recent pelvic ultrasound results being normal  Patient was also recently seen by urology for frequent UTI symptoms  Urologist did not find anything significant  Patient states a urine culture was sent but results have not been reported    Patient states that 2 days prior to seeing urology discharge she began having intermittent left lower quadrant pain  She states that she has normal bowel movements  The pelvic ultrasound was done after the pain started and was normal    Patient will call urology office to make sure that the urine culture was sent  She will keep me informed regarding the results  I discussed with patient that left lower quadrant pain could also be related to her GI system

## 2025-07-08 DIAGNOSIS — N39.0 E. COLI UTI: ICD-10-CM

## 2025-07-08 DIAGNOSIS — R39.9 UTI SYMPTOMS: Primary | ICD-10-CM

## 2025-07-08 DIAGNOSIS — B96.20 E. COLI UTI: ICD-10-CM

## 2025-07-08 RX ORDER — NITROFURANTOIN 25; 75 MG/1; MG/1
100 CAPSULE ORAL 2 TIMES DAILY
Qty: 14 CAPSULE | Refills: 0 | Status: SHIPPED | OUTPATIENT
Start: 2025-07-08 | End: 2025-07-15

## 2025-07-17 DIAGNOSIS — R39.9 UTI SYMPTOMS: Primary | ICD-10-CM

## 2025-07-17 DIAGNOSIS — R39.9 UTI SYMPTOMS: ICD-10-CM

## 2025-07-23 LAB
BACTERIA UR CULT: NORMAL
BACTERIA UR CULT: NORMAL

## 2025-07-25 DIAGNOSIS — R39.9 UTI SYMPTOMS: ICD-10-CM

## 2025-08-22 DIAGNOSIS — R39.9 UTI SYMPTOMS: ICD-10-CM

## 2025-12-18 ENCOUNTER — APPOINTMENT (OUTPATIENT)
Dept: OBSTETRICS AND GYNECOLOGY | Facility: CLINIC | Age: 30
End: 2025-12-18
Payer: COMMERCIAL